# Patient Record
Sex: FEMALE | Race: WHITE | Employment: UNEMPLOYED | URBAN - METROPOLITAN AREA
[De-identification: names, ages, dates, MRNs, and addresses within clinical notes are randomized per-mention and may not be internally consistent; named-entity substitution may affect disease eponyms.]

---

## 2017-05-09 ENCOUNTER — HOSPITAL ENCOUNTER (EMERGENCY)
Age: 22
Discharge: HOME | End: 2017-05-09
Attending: EMERGENCY MEDICINE
Payer: COMMERCIAL

## 2017-05-09 VITALS
OXYGEN SATURATION: 100 % | HEIGHT: 65 IN | DIASTOLIC BLOOD PRESSURE: 70 MMHG | SYSTOLIC BLOOD PRESSURE: 117 MMHG | BODY MASS INDEX: 31.49 KG/M2 | WEIGHT: 189 LBS | HEART RATE: 65 BPM | RESPIRATION RATE: 17 BRPM | TEMPERATURE: 97.9 F

## 2017-05-09 DIAGNOSIS — F10.920 ACUTE ALCOHOLIC INTOXICATION, UNCOMPLICATED (HCC): ICD-10-CM

## 2017-05-09 DIAGNOSIS — T78.40XA ALLERGIC REACTION, INITIAL ENCOUNTER: Primary | ICD-10-CM

## 2017-05-09 LAB
ETHANOL PERCENT: 0.22 %
ETHANOL: 221 MG/DL

## 2017-05-09 PROCEDURE — 96374 THER/PROPH/DIAG INJ IV PUSH: CPT

## 2017-05-09 PROCEDURE — 2500000003 HC RX 250 WO HCPCS: Performed by: EMERGENCY MEDICINE

## 2017-05-09 PROCEDURE — 96375 TX/PRO/DX INJ NEW DRUG ADDON: CPT

## 2017-05-09 PROCEDURE — G0480 DRUG TEST DEF 1-7 CLASSES: HCPCS

## 2017-05-09 PROCEDURE — 6360000002 HC RX W HCPCS: Performed by: EMERGENCY MEDICINE

## 2017-05-09 PROCEDURE — S0028 INJECTION, FAMOTIDINE, 20 MG: HCPCS | Performed by: EMERGENCY MEDICINE

## 2017-05-09 PROCEDURE — 2580000003 HC RX 258: Performed by: EMERGENCY MEDICINE

## 2017-05-09 PROCEDURE — 99285 EMERGENCY DEPT VISIT HI MDM: CPT

## 2017-05-09 RX ORDER — PREDNISONE 10 MG/1
TABLET ORAL
Qty: 20 TABLET | Refills: 0 | Status: SHIPPED | OUTPATIENT
Start: 2017-05-09 | End: 2017-05-19

## 2017-05-09 RX ORDER — FAMOTIDINE 20 MG/1
20 TABLET, FILM COATED ORAL 2 TIMES DAILY
Qty: 10 TABLET | Refills: 0 | Status: SHIPPED | OUTPATIENT
Start: 2017-05-09 | End: 2018-12-17

## 2017-05-09 RX ORDER — DIPHENHYDRAMINE HCL 25 MG
25 CAPSULE ORAL EVERY 6 HOURS PRN
Qty: 20 CAPSULE | Refills: 0 | Status: SHIPPED | OUTPATIENT
Start: 2017-05-09 | End: 2017-05-19

## 2017-05-09 RX ORDER — METHYLPREDNISOLONE SODIUM SUCCINATE 125 MG/2ML
125 INJECTION, POWDER, LYOPHILIZED, FOR SOLUTION INTRAMUSCULAR; INTRAVENOUS ONCE
Status: COMPLETED | OUTPATIENT
Start: 2017-05-09 | End: 2017-05-09

## 2017-05-09 RX ORDER — 0.9 % SODIUM CHLORIDE 0.9 %
1000 INTRAVENOUS SOLUTION INTRAVENOUS ONCE
Status: COMPLETED | OUTPATIENT
Start: 2017-05-09 | End: 2017-05-09

## 2017-05-09 RX ADMIN — METHYLPREDNISOLONE SODIUM SUCCINATE 125 MG: 125 INJECTION, POWDER, FOR SOLUTION INTRAMUSCULAR; INTRAVENOUS at 17:29

## 2017-05-09 RX ADMIN — FAMOTIDINE 20 MG: 10 INJECTION, SOLUTION INTRAVENOUS at 17:29

## 2017-05-09 RX ADMIN — SODIUM CHLORIDE 1000 ML: 9 INJECTION, SOLUTION INTRAVENOUS at 17:29

## 2017-05-10 ASSESSMENT — ENCOUNTER SYMPTOMS
SHORTNESS OF BREATH: 0
WHEEZING: 0
FACIAL SWELLING: 1
STRIDOR: 0
ABDOMINAL PAIN: 0
VOMITING: 0
SORE THROAT: 0
NAUSEA: 0

## 2018-04-06 ENCOUNTER — OFFICE VISIT (OUTPATIENT)
Dept: FAMILY MEDICINE CLINIC | Age: 23
End: 2018-04-06
Payer: COMMERCIAL

## 2018-04-06 VITALS
BODY MASS INDEX: 27.97 KG/M2 | RESPIRATION RATE: 18 BRPM | HEIGHT: 66 IN | TEMPERATURE: 97.2 F | DIASTOLIC BLOOD PRESSURE: 64 MMHG | OXYGEN SATURATION: 98 % | HEART RATE: 58 BPM | WEIGHT: 174 LBS | SYSTOLIC BLOOD PRESSURE: 108 MMHG

## 2018-04-06 DIAGNOSIS — H00.011 HORDEOLUM EXTERNUM OF RIGHT UPPER EYELID: Primary | ICD-10-CM

## 2018-04-06 PROCEDURE — 99214 OFFICE O/P EST MOD 30 MIN: CPT | Performed by: NURSE PRACTITIONER

## 2018-04-06 RX ORDER — POLYMYXIN B SULFATE AND TRIMETHOPRIM 1; 10000 MG/ML; [USP'U]/ML
1 SOLUTION OPHTHALMIC EVERY 6 HOURS
Qty: 10 ML | Refills: 0 | Status: SHIPPED | OUTPATIENT
Start: 2018-04-06 | End: 2018-04-16

## 2018-04-06 RX ORDER — IBUPROFEN 800 MG/1
800 TABLET ORAL EVERY 6 HOURS PRN
COMMUNITY
End: 2018-12-17

## 2018-04-06 ASSESSMENT — ENCOUNTER SYMPTOMS
EYE REDNESS: 0
WHEEZING: 0
SORE THROAT: 0
FOREIGN BODY SENSATION: 0
VOICE CHANGE: 0
BLURRED VISION: 1
CHEST TIGHTNESS: 0
PHOTOPHOBIA: 0
EYE PAIN: 1
EYE DISCHARGE: 1
EYE ITCHING: 0
SINUS PRESSURE: 0
SHORTNESS OF BREATH: 0
COUGH: 0

## 2018-12-17 ENCOUNTER — OFFICE VISIT (OUTPATIENT)
Dept: FAMILY MEDICINE CLINIC | Age: 23
End: 2018-12-17
Payer: COMMERCIAL

## 2018-12-17 VITALS
WEIGHT: 213 LBS | BODY MASS INDEX: 34.23 KG/M2 | OXYGEN SATURATION: 99 % | SYSTOLIC BLOOD PRESSURE: 108 MMHG | HEART RATE: 101 BPM | HEIGHT: 66 IN | TEMPERATURE: 98 F | DIASTOLIC BLOOD PRESSURE: 62 MMHG | RESPIRATION RATE: 18 BRPM

## 2018-12-17 DIAGNOSIS — L73.9 FOLLICULITIS: Primary | ICD-10-CM

## 2018-12-17 PROCEDURE — 99213 OFFICE O/P EST LOW 20 MIN: CPT | Performed by: NURSE PRACTITIONER

## 2018-12-17 RX ORDER — CEPHALEXIN 500 MG/1
500 CAPSULE ORAL 3 TIMES DAILY
Qty: 30 CAPSULE | Refills: 0 | Status: SHIPPED | OUTPATIENT
Start: 2018-12-17 | End: 2018-12-27

## 2018-12-17 RX ORDER — MAGNESIUM HYDROXIDE 400 MG/5ML
1 SUSPENSION, ORAL (FINAL DOSE FORM) ORAL
COMMUNITY
End: 2021-12-06

## 2018-12-17 ASSESSMENT — PATIENT HEALTH QUESTIONNAIRE - PHQ9
2. FEELING DOWN, DEPRESSED OR HOPELESS: 0
SUM OF ALL RESPONSES TO PHQ QUESTIONS 1-9: 0
SUM OF ALL RESPONSES TO PHQ9 QUESTIONS 1 & 2: 0
1. LITTLE INTEREST OR PLEASURE IN DOING THINGS: 0
SUM OF ALL RESPONSES TO PHQ QUESTIONS 1-9: 0

## 2018-12-17 ASSESSMENT — ENCOUNTER SYMPTOMS
NAUSEA: 0
ABDOMINAL PAIN: 0

## 2019-03-14 ENCOUNTER — HOSPITAL ENCOUNTER (EMERGENCY)
Age: 24
Discharge: LEFT BEFORE DISCHARGE | End: 2019-03-14
Payer: COMMERCIAL

## 2020-01-13 ENCOUNTER — HOSPITAL ENCOUNTER (EMERGENCY)
Age: 25
Discharge: HOME | End: 2020-01-13
Attending: EMERGENCY MEDICINE
Payer: COMMERCIAL

## 2020-01-13 VITALS
RESPIRATION RATE: 16 BRPM | SYSTOLIC BLOOD PRESSURE: 120 MMHG | HEART RATE: 96 BPM | WEIGHT: 205 LBS | DIASTOLIC BLOOD PRESSURE: 75 MMHG | BODY MASS INDEX: 32.95 KG/M2 | TEMPERATURE: 98.1 F | HEIGHT: 66 IN | OXYGEN SATURATION: 96 %

## 2020-01-13 PROCEDURE — 99284 EMERGENCY DEPT VISIT MOD MDM: CPT

## 2020-01-13 PROCEDURE — 93005 ELECTROCARDIOGRAM TRACING: CPT | Performed by: EMERGENCY MEDICINE

## 2020-01-13 ASSESSMENT — ENCOUNTER SYMPTOMS
ABDOMINAL PAIN: 0
INGESTION: 1
SHORTNESS OF BREATH: 0

## 2020-01-13 NOTE — ED NOTES
Here per EMS. Assisted to stretcher. EMS states pt snorted fentanyl. Narcan 2mg given to pt per EMS intranasally. Dr Combs Hirsantiago in to see pt. Discussing episode with pt and December's overdose episode. Pt denies suicidal ideation. Dr Lauryn Levin removes nasal airway per pt request. On monitor. Heart tones strong, regular. Denies chest pain, SOB, or dyspnea. Skin pink, warm, dry. Cooperative. Tearful. Emotional support given.      Selwyn Galeazzi, RN  01/13/20 100 Doctor [address] Dr, RN  01/13/20 5581

## 2020-01-13 NOTE — ED NOTES
IV per EMS removed. [employer] called for ride home. Pt requested to have taxi drop off at the end of her street.      Kamila Lord, USHA  01/13/20 0149

## 2020-01-13 NOTE — ED NOTES
Bed: 26  Expected date: 1/13/20  Expected time: 3:09 PM  Means of arrival: [facility]  Comments:  [facility]     [facility]  01/13/20 1513

## 2020-01-13 NOTE — ED PROVIDER NOTES
EMERGENCY DEPARTMENT ENCOUNTER    Pt Name: Lyric Carter  MRN: [id number]  [place] 1995  Date of evaluation: 1/13/20  CHIEF COMPLAINT       Chief Complaint   Patient presents with    Drug Overdose     HISTORY OF PRESENT ILLNESS   Presents for heroin overdose. Found unresponsive in her car in a [employer] parking lot. No trauma. First responders gave Narcan 2 mg IN with positive response. Patient has no c/o aside from discomfort from nasal airway. Similar presentation to [facility] in December. Has a Narcan IN Tool Kit at home. States she is not pregnant, and currently on her menstrual period. The history is provided by the patient, the EMS personnel and medical records. Ingestion   Ingested substance:  Illicit drugs  Suspected agents: heroin. Time since incident:  30 minutes  Incident location: [facility] parking [facility]. Context: recreational    Associated symptoms: unresponsiveness    Associated symptoms: no abdominal pain, no chest pain, no diaphoresis, no headaches and no shortness of breath    Risk factors: similar prior episodes        REVIEW OF SYSTEMS     Review of Systems   Constitutional: Negative for diaphoresis. Respiratory: Negative for shortness of breath. Cardiovascular: Negative for chest pain and palpitations. Gastrointestinal: Negative for abdominal pain. Endocrine: Negative for cold intolerance and heat intolerance. Musculoskeletal: Negative for neck pain. Skin: Negative for wound. Neurological: Negative for headaches. Hematological: Does not bruise/bleed easily. Psychiatric/Behavioral: Negative for suicidal ideas.      PASTMEDICAL HISTORY     Past Medical History:   Diagnosis Date    Hepatitis C      SURGICAL HISTORY       Past Surgical History:   Procedure Laterality Date    WISDOM TOOTH EXTRACTION       CURRENT MEDICATIONS       Previous Medications    ASPIRIN 81 MG TABLET    Take 162 mg by mouth    PRENATAL MV-MIN-FE FUM-FA-DHA (PRENATAL MULTI +DHA) 27-0.8-250 MG MEDICATIONS:  New Prescriptions    No medications on file     Genny Bautista MD  Attending Emergency Physician                    Francoise Fiore MD  01/13/20 0754

## 2020-01-13 NOTE — ED NOTES
Unable to obtain ride home. Charge RN agreed to pay for taxi ride home.       Jose F Nielson RN  01/13/20 3795

## 2020-01-14 LAB
EKG ATRIAL RATE: 108 BPM
EKG P AXIS: 71 DEGREES
EKG P-R INTERVAL: 130 MS
EKG Q-T INTERVAL: 336 MS
EKG QRS DURATION: 84 MS
EKG QTC CALCULATION (BAZETT): 450 MS
EKG R AXIS: 75 DEGREES
EKG T AXIS: 30 DEGREES
EKG VENTRICULAR RATE: 108 BPM

## 2020-01-14 PROCEDURE — 93010 ELECTROCARDIOGRAM REPORT: CPT | Performed by: INTERNAL MEDICINE

## 2021-06-01 ENCOUNTER — OFFICE VISIT (OUTPATIENT)
Dept: PRIMARY CARE CLINIC | Age: 26
End: 2021-06-01
Payer: MEDICAID

## 2021-06-01 VITALS
TEMPERATURE: 97 F | DIASTOLIC BLOOD PRESSURE: 92 MMHG | WEIGHT: 195 LBS | HEART RATE: 100 BPM | HEIGHT: 67 IN | SYSTOLIC BLOOD PRESSURE: 130 MMHG | OXYGEN SATURATION: 98 % | BODY MASS INDEX: 30.61 KG/M2

## 2021-06-01 DIAGNOSIS — L02.512 ABSCESS OF DORSUM OF LEFT HAND: Primary | ICD-10-CM

## 2021-06-01 PROCEDURE — 99214 OFFICE O/P EST MOD 30 MIN: CPT | Performed by: FAMILY MEDICINE

## 2021-06-01 RX ORDER — SULFAMETHOXAZOLE AND TRIMETHOPRIM 800; 160 MG/1; MG/1
1 TABLET ORAL 2 TIMES DAILY
Qty: 20 TABLET | Refills: 0 | Status: SHIPPED | OUTPATIENT
Start: 2021-06-01 | End: 2021-06-11

## 2021-06-01 ASSESSMENT — ENCOUNTER SYMPTOMS
DIARRHEA: 0
SWOLLEN GLANDS: 0
COUGH: 0
CHANGE IN BOWEL HABIT: 0
WHEEZING: 0
SORE THROAT: 0
NAUSEA: 0
VOMITING: 0
SHORTNESS OF BREATH: 0

## 2021-06-01 ASSESSMENT — PATIENT HEALTH QUESTIONNAIRE - PHQ9
1. LITTLE INTEREST OR PLEASURE IN DOING THINGS: 0
SUM OF ALL RESPONSES TO PHQ9 QUESTIONS 1 & 2: 0
SUM OF ALL RESPONSES TO PHQ QUESTIONS 1-9: 0
2. FEELING DOWN, DEPRESSED OR HOPELESS: 0
SUM OF ALL RESPONSES TO PHQ QUESTIONS 1-9: 0
SUM OF ALL RESPONSES TO PHQ QUESTIONS 1-9: 0

## 2021-06-01 NOTE — PROGRESS NOTES
[facility] [address] [facility] [place] [facility]  [address] [place] [address] [address] [address]  Dept: [phone]  Dept Fax: [phone]    Mima Curling is a 27 y.o. female who presents today for her medical conditions/complaintsas noted below. Mima Curling is c/o of Other (abcess on left hand swollen red and warm to touch X 3 days )        HPI:     Patient is here for abscess of the left hand which is swollen erythematous and warm to touch  Patient reports that abscess started draining overnight    Other  This is a new problem. The current episode started in the past 7 days (3 days). The problem occurs constantly. The problem has been gradually improving. Associated symptoms include numbness and a rash. Pertinent negatives include no change in bowel habit, chills, coughing, fever, myalgias, nausea, sore throat, swollen glands or vomiting. Nothing aggravates the symptoms. She has tried NSAIDs and heat for the symptoms. The treatment provided moderate relief.    Patient reports that infection started likely due to IV drug use    Past Medical History:   Diagnosis Date    Hepatitis C     Past medical history reviewed and pertinent positives/negatives in the HPI    Past Surgical History:   Procedure Laterality Date    WISDOM TOOTH EXTRACTION         Family History   Problem Relation Age of Onset    No Known Problems Mother     No Known Problems Father        Social History     Tobacco Use    Smoking status: Current Every Day Smoker     Packs/day: 1.00     Types: Cigarettes    Smokeless tobacco: Never Used   Substance Use Topics    Alcohol use: No      Current Outpatient Medications   Medication Sig Dispense Refill    sulfamethoxazole-trimethoprim (BACTRIM DS) 800-160 MG per tablet Take 1 tablet by mouth 2 times daily for 10 days 20 tablet 0    aspirin 81 MG tablet Take 162 mg by mouth      Prenatal MV-Min-Fe Fum-FA-DHA (PRENATAL MULTI +DHA) 27-0.8-250 MG CAPS Take 1 tablet by mouth       No current facility-administered medications for this visit. Allergies   Allergen Reactions    Glucosamine Swelling    Shellfish-Derived Products Swelling    Other        Health Maintenance   Topic Date Due    Hepatitis C screen  Never done    Varicella vaccine (1 of 2 - 2-dose childhood series) Never done    Pneumococcal 0-64 years Vaccine (1 of 2 - PPSV23) Never done    COVID-19 Vaccine (1) Never done    HIV screen  Never done    Cervical cancer screen  Never done    Flu vaccine (Season Ended) 09/01/2021    DTaP/Tdap/Td vaccine (2 - Td) 01/28/2029    Hepatitis A vaccine  Aged Out    Hepatitis B vaccine  Aged Out    Hib vaccine  Aged Out    Meningococcal (ACWY) vaccine  Aged Out       :      Review of Systems   Constitutional: Negative for chills and fever. HENT: Negative. Negative for sore throat. Respiratory: Negative for cough, shortness of breath and wheezing. Gastrointestinal: Negative for change in bowel habit, diarrhea, nausea and vomiting. Musculoskeletal: Negative for myalgias. Skin: Positive for rash and wound. Neurological: Positive for numbness. Hematological: Negative for adenopathy. Objective:     Physical Exam  Vitals and nursing note reviewed. Constitutional:       Appearance: Normal appearance. She is obese. HENT:      Head: Normocephalic and atraumatic. Right Ear: Hearing normal.      Left Ear: Hearing normal.      Mouth/Throat:      Lips: Pink. Eyes:      Extraocular Movements: Extraocular movements intact. Conjunctiva/sclera: Conjunctivae normal.   Cardiovascular:      Rate and Rhythm: Normal rate and regular rhythm. Heart sounds: Normal heart sounds. Pulmonary:      Effort: Pulmonary effort is normal.      Breath sounds: Normal breath sounds. Musculoskeletal:      Cervical back: No muscular tenderness. Skin:     General: Skin is warm and dry. Findings: Abscess present.           Neurological:      Mental Status: She is alert and oriented to person, place, and time. Mental status is at baseline. Psychiatric:         Mood and Affect: Mood normal.         Behavior: Behavior normal.         Thought Content: Thought content normal.         Judgment: Judgment normal.       BP (!) 130/92 (Site: Left Upper Arm, Position: Sitting, Cuff Size: Large Adult)   Pulse 100   Temp 97 °F (36.1 °C) (Tympanic)   Ht 5' 7\" (1.702 m)   Wt 195 lb (88.5 kg)   SpO2 98%   Breastfeeding No   BMI 30.54 kg/m²     Assessment:       Diagnosis Orders   1. Abscess of dorsum of left hand         Plan:      Take antibiotic as prescribed for abscess/cellulitis  Keep wound clean and dry and change dressing as needed. May apply antibiotic ointment 2-3 times daily  If symptoms worsen or do not improve please follow-up with PCP or return to clinic  If you experience fever or increased heart rate then go to ER  No orders of the defined types were placed in this encounter. Orders Placed This Encounter   Medications    sulfamethoxazole-trimethoprim (BACTRIM DS) 800-160 MG per tablet     Sig: Take 1 tablet by mouth 2 times daily for 10 days     Dispense:  20 tablet     Refill:  0      Patient given educational materials - see patient instructions. Discussed use, benefit, and side effects of prescribed medications. All patient questions answered. Pt voiced understanding. Patient agreed with treatment plan. Follow up as directed.      Electronicallysigned by Brijesh Cardoso MD on 6/1/2021 at 2:07 PM

## 2021-06-01 NOTE — PATIENT INSTRUCTIONS
Patient Education        Skin Abscess: Care Instructions  Your Care Instructions     A skin abscess is a bacterial infection that forms a pocket of pus. A boil is a kind of skin abscess. The doctor may have cut an opening in the abscess so that the pus can drain out. You may have gauze in the cut so that the abscess will stay open and keep draining. You may need antibiotics. You will need to follow up with your doctor to make sure the infection has gone away. The doctor has checked you carefully, but problems can develop later. If you notice any problems or new symptoms, get medical treatment right away. Follow-up care is a key part of your treatment and safety. Be sure to make and go to all appointments, and call your doctor if you are having problems. It's also a good idea to know your test results and keep a list of the medicines you take. How can you care for yourself at home? · Apply warm and dry compresses, a heating pad set on low, or a hot water bottle 3 or 4 times a day for pain. Keep a cloth between the heat source and your skin. · If your doctor prescribed antibiotics, take them as directed. Do not stop taking them just because you feel better. You need to take the full course of antibiotics. · Take pain medicines exactly as directed. ? If the doctor gave you a prescription medicine for pain, take it as prescribed. ? If you are not taking a prescription pain medicine, ask your doctor if you can take an over-the-counter medicine. · Keep your bandage clean and dry. Change the bandage whenever it gets wet or dirty, or at least one time a day. · If the abscess was packed with gauze:  ? Keep follow-up appointments to have the gauze changed or removed. If the doctor instructed you to remove the gauze, follow the instructions you were given for how to remove it. ? After the gauze is removed, soak the area in warm water for 15 to 20 minutes 2 times a day, until the wound closes.   When should you call for help? Call your doctor now or seek immediate medical care if:    · You have signs of worsening infection, such as:  ? Increased pain, swelling, warmth, or redness. ? Red streaks leading from the infected skin. ? Pus draining from the wound. ? A fever. Watch closely for changes in your health, and be sure to contact your doctor if:    · You do not get better as expected. Where can you learn more? Go to https://InDMusicpeAzendooeb.My True Fit. org and sign in to your Learn [employer] account. Enter M690 in the TwoChop box to learn more about \"Skin Abscess: Care Instructions. \"     If you do not have an account, please click on the \"Sign Up Now\" link. Current as of: July 2, 2020               Content Version: 12.8  © 5492-2416 [employer]. Care instructions adapted under license by [facility] ([facility]). If you have questions about a medical condition or this instruction, always ask your healthcare professional. [address] any warranty or liability for your use of this information. Take antibiotic as prescribed for abscess/cellulitis  Keep wound clean and dry and change dressing as needed.  May apply antibiotic ointment 2-3 times daily  If symptoms worsen or do not improve please follow-up with PCP or return to clinic  If you experience fever or increased heart rate then go to ER

## 2021-06-30 ENCOUNTER — OFFICE VISIT (OUTPATIENT)
Dept: PRIMARY CARE CLINIC | Age: 26
End: 2021-06-30
Payer: MEDICAID

## 2021-06-30 ENCOUNTER — TELEPHONE (OUTPATIENT)
Dept: FAMILY MEDICINE CLINIC | Age: 26
End: 2021-06-30

## 2021-06-30 VITALS
BODY MASS INDEX: 30.61 KG/M2 | HEART RATE: 113 BPM | TEMPERATURE: 98.6 F | OXYGEN SATURATION: 98 % | WEIGHT: 195 LBS | HEIGHT: 67 IN

## 2021-06-30 DIAGNOSIS — L02.519 ABSCESS, HAND: Primary | ICD-10-CM

## 2021-06-30 PROCEDURE — 99214 OFFICE O/P EST MOD 30 MIN: CPT | Performed by: FAMILY MEDICINE

## 2021-06-30 RX ORDER — SULFAMETHOXAZOLE AND TRIMETHOPRIM 800; 160 MG/1; MG/1
1 TABLET ORAL 2 TIMES DAILY
Qty: 20 TABLET | Refills: 0 | Status: SHIPPED | OUTPATIENT
Start: 2021-06-30 | End: 2021-07-10

## 2021-06-30 ASSESSMENT — ENCOUNTER SYMPTOMS: RESPIRATORY NEGATIVE: 1

## 2021-06-30 NOTE — PROGRESS NOTES
[facility] [address] [facility] [place] [facility]  [address] [facility]  [place] [address]  Dept: [phone]  Dept Fax: [phone]    Froylan Al is a 27 y.o. female who presents today for her medical conditions/complaintsas noted below. Froylan Al is c/o of Swelling (right hand swelling, redness, pain x 4 days )        HPI:     Hand Pain   The incident occurred 3 to 5 days ago. The incident occurred at home. The injury mechanism is unknown (Patient reports possible bug bite. She reports that she has not used any drugs since she was seen at the beginning of June. ). The pain is present in the right hand. The pain is at a severity of 10/10. The pain is severe. The pain has been constant since the incident. Associated symptoms include muscle weakness, numbness (Right hand due to swelling) and tingling. The symptoms are aggravated by movement and palpation. She has tried nothing for the symptoms. The treatment provided no relief.        Past Medical History:   Diagnosis Date    Hepatitis C     Past medical history reviewed and pertinent positives/negatives in the HPI    Past Surgical History:   Procedure Laterality Date    WISDOM TOOTH EXTRACTION         Family History   Problem Relation Age of Onset    No Known Problems Mother     No Known Problems Father        Social History     Tobacco Use    Smoking status: Current Every Day Smoker     Packs/day: 1.00     Types: Cigarettes    Smokeless tobacco: Never Used   Substance Use Topics    Alcohol use: No      Current Outpatient Medications   Medication Sig Dispense Refill    sulfamethoxazole-trimethoprim (BACTRIM DS) 800-160 MG per tablet Take 1 tablet by mouth 2 times daily for 10 days 20 tablet 0    aspirin 81 MG tablet Take 162 mg by mouth (Patient not taking: Reported on 6/30/2021)      Prenatal MV-Min-Fe Fum-FA-DHA (PRENATAL MULTI +DHA) 27-0.8-250 MG CAPS Take 1 tablet by mouth (Patient not taking: Reported on 6/30/2021)       No current facility-administered medications for this visit. Allergies   Allergen Reactions    Glucosamine Swelling    Shellfish-Derived Products Swelling    Other        Health Maintenance   Topic Date Due    Hepatitis C screen  Never done    Varicella vaccine (1 of 2 - 2-dose childhood series) Never done    Pneumococcal 0-64 years Vaccine (1 of 2 - PPSV23) Never done    COVID-19 Vaccine (1) Never done    HIV screen  Never done    Cervical cancer screen  Never done    Flu vaccine (Season Ended) 09/01/2021    DTaP/Tdap/Td vaccine (2 - Td or Tdap) 01/28/2029    Hepatitis A vaccine  Aged Out    Hepatitis B vaccine  Aged Out    Hib vaccine  Aged Out    Meningococcal (ACWY) vaccine  Aged Out       :      Review of Systems   Constitutional: Negative for chills and fever. HENT: Negative. Respiratory: Negative. Cardiovascular: Negative. Musculoskeletal: Negative for myalgias. Skin: Positive for rash and wound. Neurological: Positive for tingling, weakness (Right hand due to swelling) and numbness (Right hand due to swelling). Hematological: Negative for adenopathy. Objective:     Physical Exam  Vitals and nursing note reviewed. Constitutional:       Appearance: Normal appearance. She is obese. HENT:      Head: Normocephalic and atraumatic. Right Ear: Hearing normal.      Left Ear: Hearing normal.      Mouth/Throat:      Lips: Pink. Eyes:      Extraocular Movements: Extraocular movements intact. Conjunctiva/sclera: Conjunctivae normal.   Cardiovascular:      Rate and Rhythm: Normal rate. Pulmonary:      Effort: Pulmonary effort is normal.   Musculoskeletal:         General: Swelling (Severe swelling of the dorsum of the right hand) and tenderness (With any sort of movement or light touch of the dorsum of the right hand) present. Cervical back: No muscular tenderness. Skin:     General: Skin is warm and dry.       Findings: Lesion (Severe erythema and swelling of the dorsum of right hand extending to the proximal phalanges. There is an area of fluctuation with a small pustule between the second and third MCPs) present. Neurological:      Mental Status: She is alert and oriented to person, place, and time. Mental status is at baseline. Psychiatric:         Mood and Affect: Mood normal.         Behavior: Behavior normal.         Thought Content: Thought content normal.         Judgment: Judgment normal.       Pulse 113   Temp 98.6 °F (37 °C) (Infrared)   Ht 5' 7\" (1.702 m)   Wt 195 lb (88.5 kg)   SpO2 98%   BMI 30.54 kg/m²       Procedure: Attempted drainage of abscess of dorsum of right hand. Patient declined any preprocedure numbing/analgesia. Dorsum of hand was cleaned with iodine swab. An 11 blade scalpel was then used to make approximately 3 mm incision over top of the pustule on the right hand. There was a small amount of purulent material expressed. Attempt was made to manually express more purulent material but patient did not tolerate this procedure. We then attempted to soak the hand in Hibiclens and water solution. However patient did not tolerate this either. Patient voiced that she did not wish to proceed with any further attempt at draining the abscess of her hand. She also did not wish to have the incision bandaged for fear that taking off the bandage would cause more pain. I recommended that patient go to ER for further evaluation and drainage of the abscess. I discussed the risks of not getting the abscess treated emergently including permanent loss of sensation or movement of the hand or spread of the infection. Patient verbalized understanding and voiced that she would go to ER if symptoms got any worse. Assessment:       Diagnosis Orders   1. Abscess, hand         Plan:    Take antibiotic as prescribed for infection of the hand.   Apply warm compresses to the hand often  Keep the wound clean and dry  Recommend going to ER for further evaluation and drainage of abscess on hand to prevent permanent damage to the hand or spread of infection    No orders of the defined types were placed in this encounter. Orders Placed This Encounter   Medications    sulfamethoxazole-trimethoprim (BACTRIM DS) 800-160 MG per tablet     Sig: Take 1 tablet by mouth 2 times daily for 10 days     Dispense:  20 tablet     Refill:  0      Patient given educational materials - see patient instructions. Discussed use, benefit, and side effects of prescribed medications. All patient questions answered. Pt voiced understanding. Patient agreed with treatment plan. Follow up as directed.      Electronicallysigned by Janet Mcclain MD on 6/30/2021 at 6:15 PM

## 2021-06-30 NOTE — TELEPHONE ENCOUNTER
Advised patient, she states shes in a lot of pain and asked if she can come in now?  I advised yes walk in is open until 8

## 2021-06-30 NOTE — PATIENT INSTRUCTIONS
Patient Education        Skin Abscess: Care Instructions  Your Care Instructions     A skin abscess is a bacterial infection that forms a pocket of pus. A boil is a kind of skin abscess. The doctor may have cut an opening in the abscess so that the pus can drain out. You may have gauze in the cut so that the abscess will stay open and keep draining. You may need antibiotics. You will need to follow up with your doctor to make sure the infection has gone away. The doctor has checked you carefully, but problems can develop later. If you notice any problems or new symptoms, get medical treatment right away. Follow-up care is a key part of your treatment and safety. Be sure to make and go to all appointments, and call your doctor if you are having problems. It's also a good idea to know your test results and keep a list of the medicines you take. How can you care for yourself at home? · Apply warm and dry compresses, a heating pad set on low, or a hot water bottle 3 or 4 times a day for pain. Keep a cloth between the heat source and your skin. · If your doctor prescribed antibiotics, take them as directed. Do not stop taking them just because you feel better. You need to take the full course of antibiotics. · Take pain medicines exactly as directed. ? If the doctor gave you a prescription medicine for pain, take it as prescribed. ? If you are not taking a prescription pain medicine, ask your doctor if you can take an over-the-counter medicine. · Keep your bandage clean and dry. Change the bandage whenever it gets wet or dirty, or at least one time a day. · If the abscess was packed with gauze:  ? Keep follow-up appointments to have the gauze changed or removed. If the doctor instructed you to remove the gauze, follow the instructions you were given for how to remove it. ? After the gauze is removed, soak the area in warm water for 15 to 20 minutes 2 times a day, until the wound closes.   When should you call for help? Call your doctor now or seek immediate medical care if:    · You have signs of worsening infection, such as:  ? Increased pain, swelling, warmth, or redness. ? Red streaks leading from the infected skin. ? Pus draining from the wound. ? A fever. Watch closely for changes in your health, and be sure to contact your doctor if:    · You do not get better as expected. Where can you learn more? Go to https://Elite FormpeiSTAReb.Icount.com. org and sign in to your [employer] account. Enter S822 in the PostHelpers box to learn more about \"Skin Abscess: Care Instructions. \"     If you do not have an account, please click on the \"Sign Up Now\" link. Current as of: March 3, 2021               Content Version: 12.9  © 2006-2021 [employer]. Care instructions adapted under license by [facility] ([facility]). If you have questions about a medical condition or this instruction, always ask your healthcare professional. [place] [address] any warranty or liability for your use of this information. Take antibiotic as prescribed for infection of the hand.   Apply warm compresses to the hand often  Keep the wound clean and dry  Recommend going to ER for further evaluation and drainage of abscess on hand to prevent permanent damage to the hand or spread of infection

## 2021-06-30 NOTE — TELEPHONE ENCOUNTER
----- Message from Laila Gotti sent at 6/30/2021  3:30 PM EDT -----  Subject: Message to Provider    QUESTIONS  Information for Provider? Patient stated that she has an absess on her   right middle finger and she can not come into the office due to house   arrest so she would like to have an antibiotic called in to [address] in [place]. Please contact to advise if this can be done.  ---------------------------------------------------------------------------  --------------  CALL BACK INFO  What is the best way for the office to contact you? OK to leave message on   voicemail  Preferred Call Back Phone Number? [phone]  ---------------------------------------------------------------------------  --------------  SCRIPT ANSWERS  Relationship to Patient?  Self

## 2021-07-01 ENCOUNTER — TELEPHONE (OUTPATIENT)
Dept: PRIMARY CARE CLINIC | Age: 26
End: 2021-07-01

## 2021-07-01 NOTE — TELEPHONE ENCOUNTER
Patient called stating that the abscess that she was seen for yesterday had \"burst open\" and would like to know what to clean it with. I spoke with the provider we have here today (Nannette Encinas APRN-CNP) and she advised pt uses soap and water and does warm epsom salt soaks. I asked pt further about it \"bursting open\" patient said it did not actually open up, it is just draining from where it was opened yesterday. Patient also said there is a new spot that looks like a white head. Pt advised to allow it to drain and if there is no improvement or gets worse at any point pt advised to go to ER. Pt voiced understanding.

## 2021-12-06 ENCOUNTER — HOSPITAL ENCOUNTER (OUTPATIENT)
Age: 26
Setting detail: SPECIMEN
Discharge: HOME | End: 2021-12-06

## 2021-12-06 ENCOUNTER — OFFICE VISIT (OUTPATIENT)
Dept: PRIMARY CARE CLINIC | Age: 26
End: 2021-12-06
Payer: MEDICAID

## 2021-12-06 VITALS
TEMPERATURE: 97.6 F | HEART RATE: 89 BPM | DIASTOLIC BLOOD PRESSURE: 78 MMHG | OXYGEN SATURATION: 97 % | SYSTOLIC BLOOD PRESSURE: 114 MMHG

## 2021-12-06 DIAGNOSIS — H10.33 ACUTE BACTERIAL CONJUNCTIVITIS OF BOTH EYES: ICD-10-CM

## 2021-12-06 DIAGNOSIS — H10.33 ACUTE BACTERIAL CONJUNCTIVITIS OF BOTH EYES: Primary | ICD-10-CM

## 2021-12-06 DIAGNOSIS — J01.00 ACUTE NON-RECURRENT MAXILLARY SINUSITIS: ICD-10-CM

## 2021-12-06 PROCEDURE — 99213 OFFICE O/P EST LOW 20 MIN: CPT | Performed by: PHYSICIAN ASSISTANT

## 2021-12-06 RX ORDER — AMOXICILLIN 500 MG/1
500 CAPSULE ORAL 2 TIMES DAILY
Qty: 20 CAPSULE | Refills: 0 | Status: SHIPPED | OUTPATIENT
Start: 2021-12-08 | End: 2021-12-06

## 2021-12-06 RX ORDER — PREDNISONE 20 MG/1
20 TABLET ORAL 2 TIMES DAILY
Qty: 10 TABLET | Refills: 0 | Status: SHIPPED | OUTPATIENT
Start: 2021-12-06 | End: 2021-12-11

## 2021-12-06 RX ORDER — POLYMYXIN B SULFATE AND TRIMETHOPRIM 1; 10000 MG/ML; [USP'U]/ML
1 SOLUTION OPHTHALMIC EVERY 4 HOURS
Qty: 1 EACH | Refills: 0 | Status: SHIPPED | OUTPATIENT
Start: 2021-12-06 | End: 2021-12-16

## 2021-12-06 RX ORDER — AMOXICILLIN 500 MG/1
500 CAPSULE ORAL 2 TIMES DAILY
Qty: 20 CAPSULE | Refills: 0 | Status: SHIPPED | OUTPATIENT
Start: 2021-12-08 | End: 2021-12-18

## 2021-12-06 RX ORDER — LORATADINE 10 MG/1
10 TABLET ORAL DAILY
Qty: 30 TABLET | Refills: 0 | Status: SHIPPED | OUTPATIENT
Start: 2021-12-06 | End: 2022-07-31 | Stop reason: ALTCHOICE

## 2021-12-06 ASSESSMENT — ENCOUNTER SYMPTOMS
EYE REDNESS: 1
GASTROINTESTINAL NEGATIVE: 1
SHORTNESS OF BREATH: 0
CHEST TIGHTNESS: 0
COUGH: 0
RESPIRATORY NEGATIVE: 1
EYE ITCHING: 1
SINUS PAIN: 0
EYE PAIN: 1
RHINORRHEA: 1
SINUS PRESSURE: 1
EYE DISCHARGE: 1

## 2021-12-06 NOTE — PROGRESS NOTES
[address]  [address] [address]  Phone: [phone]  Fax: [phone] [facility] Name: Alice Mccurdy  MRN: [id number]  Nathanielgfkaley 1995  Date of evaluation: 12/6/2021  Provider: Rishi Flood Dr       Chief Complaint   Patient presents with    Otalgia     bilateral but lt is worse and can't hear out of it and is popping    Eye Drainage     woke up with them crusty, green drainage - started yesterday and rt eye is worse than lt           HISTORY OF PRESENT ILLNESS  (Location/Symptom, Timing/Onset, Context/Setting, Quality, Duration, Modifying Factors, Severity.)   Alcie Mccurdy is a 27 y.o. White (non-) [1] female who presents to the office for evaluation of      Otalgia   There is pain in both ears. This is a new problem. The current episode started in the past 7 days. Associated symptoms include rhinorrhea. Pertinent negatives include no coughing or ear discharge. Conjunctivitis   Associated symptoms include a fever, eye itching, congestion, ear pain, rhinorrhea, eye discharge, eye pain and eye redness. Pertinent negatives include no ear discharge and no cough. Both eyes are affected. Nursing Notes were reviewed. REVIEW OF SYSTEMS    (2-9 systems for level 4, 10 or more for level 5)     Review of Systems   Constitutional: Positive for chills, fatigue and fever. HENT: Positive for congestion, ear pain, postnasal drip, rhinorrhea and sinus pressure. Negative for ear discharge and sinus pain. Eyes: Positive for pain, discharge, redness and itching. Respiratory: Negative. Negative for cough, chest tightness and shortness of breath. Cardiovascular: Negative. Gastrointestinal: Negative. Genitourinary: Negative. Musculoskeletal: Negative. Except as noted above the remainder of the review of systems was reviewed andnegative. PAST MEDICAL HISTORY   History reviewed. Past Medical History:   Diagnosis Date    Hepatitis C          SURGICAL HISTORY     History reviewed. Past Surgical History:   Procedure Laterality Date    WISDOM TOOTH EXTRACTION           CURRENT MEDICATIONS       Current Outpatient Medications   Medication Sig Dispense Refill    trimethoprim-polymyxin b (POLYTRIM) 78215-5.1 UNIT/ML-% ophthalmic solution Place 1 drop into both eyes every 4 hours for 10 days 1 each 0    predniSONE (DELTASONE) 20 MG tablet Take 1 tablet by mouth 2 times daily for 5 days 10 tablet 0    loratadine (CLARITIN) 10 MG tablet Take 1 tablet by mouth daily 30 tablet 0    [START ON 12/8/2021] amoxicillin (AMOXIL) 500 MG capsule Take 1 capsule by mouth 2 times daily for 10 days 20 capsule 0     No current facility-administered medications for this visit. ALLERGIES     Glucosamine, Shellfish-derived products, and Other    FAMILY HISTORY           Problem Relation Age of Onset    No Known Problems Mother     No Known Problems Father      Family Status   Relation Name Status    Mother  Alive    Father  Alive          SOCIAL HISTORY      reports that she has been smoking cigarettes. She has been smoking about 1.00 pack per day. She has never used smokeless tobacco. She reports current drug use. Drug: Opiates . She reports that she does not drink alcohol. PHYSICAL EXAM    (up to 7 for level 4, 8 or more for level 5)     Vitals:    12/06/21 0844   BP: 114/78   Site: Right Upper Arm   Position: Sitting   Cuff Size: Medium Adult   Pulse: 89   Temp: 97.6 °F (36.4 °C)   TempSrc: Temporal   SpO2: 97%         Physical Exam  Vitals and nursing note reviewed. Constitutional:       General: She is not in acute distress. Appearance: Normal appearance. She is not ill-appearing. HENT:      Head: Normocephalic and atraumatic. Right Ear: External ear normal.      Left Ear: External ear normal.      Nose: Congestion present.       Mouth/Throat:      Mouth: Mucous membranes are moist.      Pharynx: Posterior oropharyngeal erythema present. Eyes:      Extraocular Movements: Extraocular movements intact. Conjunctiva/sclera: Conjunctivae normal.      Pupils: Pupils are equal, round, and reactive to light. Cardiovascular:      Rate and Rhythm: Normal rate and regular rhythm. Pulmonary:      Effort: Pulmonary effort is normal.   Abdominal:      Palpations: Abdomen is soft. Skin:     General: Skin is warm and dry. Neurological:      Mental Status: She is alert and oriented to person, place, and time. DIFFERENTIAL DIAGNOSIS:       Paramjit Eduardo reviewed the disposition diagnosis with the patient and or their family/guardian. I have answered their questions and given discharge instructions. They voiced understanding of these instructions and did not have anyfurther questions or complaints. PROCEDURES:  Orders Placed This Encounter   Procedures    COVID-19     Standing Status:   Future     Standing Expiration Date:   12/6/2022     Scheduling Instructions:      1) Due to current limited availability of the COVID-19 test, tests will be prioritized based on responses to questions above. Testing may be delayed due to volume. 2) Print and instruct patient to adhere to CDC home isolation program. (Link Above)              3) Set up or refer patient for a monitoring program.              4) Have patient sign up for and leverage [employer] (if not previously done). Order Specific Question:   Is this test for diagnosis or screening? Answer:   Diagnosis of ill patient     Order Specific Question:   Symptomatic for COVID-19 as defined by CDC? Answer:   Yes     Order Specific Question:   Date of Symptom Onset     Answer:   12/3/2021     Order Specific Question:   Hospitalized for COVID-19? Answer:   No     Order Specific Question:   Admitted to ICU for COVID-19? Answer:   No     Order Specific Question:   Employed in healthcare setting?      Answer: Unknown     Order Specific Question:   Resident in a congregate (group) care setting? Answer:   Unknown     Order Specific Question:   Pregnant? Answer:   Unknown     Order Specific Question:   Previously tested for COVID-19? Answer:   Unknown       No results found for this visit on 12/06/21. FINALIMPRESSION      Visit Diagnoses and Associated Orders     Acute bacterial conjunctivitis of both eyes    -  Primary    COVID-19 [[id number] Custom]   - Future Order         Acute non-recurrent maxillary sinusitis        COVID-19 [[id number] Custom]   - Future Order         ORDERS WITHOUT AN ASSOCIATED DIAGNOSIS    trimethoprim-polymyxin b (POLYTRIM) 76016-2.1 UNIT/ML-% ophthalmic solution [21318]      predniSONE (DELTASONE) 20 MG tablet [6496]      loratadine (CLARITIN) 10 MG tablet [70330]      amoxicillin (AMOXIL) 500 MG capsule [451]              PLAN     Return if symptoms worsen or fail to improve. DISCHARGEMEDICATIONS:  Orders Placed This Encounter   Medications    trimethoprim-polymyxin b (POLYTRIM) 84054-8.1 UNIT/ML-% ophthalmic solution     Sig: Place 1 drop into both eyes every 4 hours for 10 days     Dispense:  1 each     Refill:  0    predniSONE (DELTASONE) 20 MG tablet     Sig: Take 1 tablet by mouth 2 times daily for 5 days     Dispense:  10 tablet     Refill:  0    loratadine (CLARITIN) 10 MG tablet     Sig: Take 1 tablet by mouth daily     Dispense:  30 tablet     Refill:  0    amoxicillin (AMOXIL) 500 MG capsule     Sig: Take 1 capsule by mouth 2 times daily for 10 days     Dispense:  20 capsule     Refill:  0         Plan:  Specimen sent for a culture. Possible treatment alteration based on the results. Based on the clinical exam findings-- I will treat this as a bacterial conjunctivitis at this time with antibiotic eye gtts. Cool compresses to the eyes if desired. Good hand hygiene encouraged. Patient agreeable to treatment plan.   Educational materials provided on AVS.  Follow up if symptoms do not improve/worsen. Based on the physical exam findings-- I believe the symptoms are related to OME. Flonase daily recommended. Meclizine as needed for the dizziness/nausea. Educational materials provided on AVS.  Follow up if symptoms do not improve/worsen. I believe that this is likely a viral illness based on the physical exam findings. Tylenol/Motrin for fever/discomfort. Patient agreeable to treatment plan. Educational materials provided on AVS.  Follow up if symptoms do not improve/worsen. Patient instructed to return to the office if symptoms worsen, return, or have any other concerns. Patient understands and is agreeable.          Gretchen Purvis PA-C 12/6/2021 9:21 AM

## 2021-12-06 NOTE — LETTER
[facility]  [address] [address]  Phone: [phone]  Fax: [phone]    Ernestina Lopez PA-C        December 6, 2021     Patient: Serafin Phillips   YOB: 1995   Date of Visit: 12/6/2021       To Whom it May Concern:    Serafin Phillips was seen in my clinic on 12/6/2021. She ius to remain off work until her [place] comes back negative  If you have any questions or concerns, please don't hesitate to call.     Sincerely,         Ernestina Lopez PA-C

## 2021-12-07 LAB
SARS-COV-2: NORMAL
SARS-COV-2: NOT DETECTED
SOURCE: NORMAL

## 2022-07-31 ENCOUNTER — OFFICE VISIT (OUTPATIENT)
Dept: PRIMARY CARE CLINIC | Age: 27
End: 2022-07-31
Payer: MEDICAID

## 2022-07-31 VITALS
DIASTOLIC BLOOD PRESSURE: 81 MMHG | SYSTOLIC BLOOD PRESSURE: 118 MMHG | BODY MASS INDEX: 33.75 KG/M2 | WEIGHT: 210 LBS | HEART RATE: 67 BPM | HEIGHT: 66 IN | OXYGEN SATURATION: 97 %

## 2022-07-31 DIAGNOSIS — L74.0 HEAT RASH: ICD-10-CM

## 2022-07-31 DIAGNOSIS — L56.8 PHOTODERMATITIS OR PHOTOSENSITIVITY: Primary | ICD-10-CM

## 2022-07-31 PROCEDURE — 99213 OFFICE O/P EST LOW 20 MIN: CPT

## 2022-07-31 RX ORDER — HYDROXYZINE HYDROCHLORIDE 25 MG/1
25 TABLET, FILM COATED ORAL EVERY 8 HOURS PRN
Qty: 30 TABLET | Refills: 0 | Status: SHIPPED | OUTPATIENT
Start: 2022-07-31 | End: 2022-08-10

## 2022-07-31 RX ORDER — METHYLPREDNISOLONE 4 MG/1
TABLET ORAL
Qty: 1 KIT | Refills: 0 | Status: SHIPPED | OUTPATIENT
Start: 2022-07-31 | End: 2022-08-06

## 2022-07-31 ASSESSMENT — ENCOUNTER SYMPTOMS
ABDOMINAL PAIN: 0
VOMITING: 0
SORE THROAT: 0
DIARRHEA: 0
WHEEZING: 0
RHINORRHEA: 0
EYE PAIN: 0
CHEST TIGHTNESS: 0
SHORTNESS OF BREATH: 0

## 2022-07-31 ASSESSMENT — PATIENT HEALTH QUESTIONNAIRE - PHQ9
SUM OF ALL RESPONSES TO PHQ QUESTIONS 1-9: 0
1. LITTLE INTEREST OR PLEASURE IN DOING THINGS: 0
SUM OF ALL RESPONSES TO PHQ9 QUESTIONS 1 & 2: 0
SUM OF ALL RESPONSES TO PHQ QUESTIONS 1-9: 0
2. FEELING DOWN, DEPRESSED OR HOPELESS: 0

## 2022-07-31 NOTE — PATIENT INSTRUCTIONS
Finish the entire course of oral steroid taper. Use Atarax as needed for itching. Recommend cool compress application. Avoid exposure sunlight and wear breathable clothing. Follow-up if no improvement.

## 2022-07-31 NOTE — PROGRESS NOTES
[address] [place] [facility] [id number] [place] [address] WALK IN CARE  [address]  [place] [address]  Dept: [phone]    Alice Mccurdy is a 32 y.o. female Established patient, who presents to the walk-in clinic today with conditions/complaints as noted below:    Chief Complaint   Patient presents with    Rash     Pt has rash on her arms and legs pt has been having headaches nausea and lightheaded   X 1 week after sitting in the sun and heat         HPI:     Rash  This is a new problem. The current episode started 1 to 4 weeks ago (about 1 week ago). The problem is unchanged. The affected locations include the right arm, left arm, left upper leg and right upper leg. The rash is characterized by itchiness, redness and pain. It is unknown if there was an exposure to a precipitant. Pertinent negatives include no diarrhea, eye pain, fever, rhinorrhea, shortness of breath, sore throat or vomiting. Past treatments include antihistamine and anti-itch cream (Benadryl, Calamine lotion). The treatment provided no relief. There is no history of allergies or eczema. Patient states that she was recently incarcerated and didn't have much sun exposure. She noticed that being outside in direct sunlight and humidity worsens it. Past Medical History:   Diagnosis Date    Hepatitis C        Current Outpatient Medications   Medication Sig Dispense Refill    methylPREDNISolone (MEDROL DOSEPACK) 4 MG tablet Take by mouth. 1 kit 0    hydrOXYzine HCl (ATARAX) 25 MG tablet Take 1 tablet by mouth every 8 hours as needed for Itching 30 tablet 0     No current facility-administered medications for this visit.        Allergies   Allergen Reactions    Glucosamine Swelling    Shellfish-Derived Products Swelling    Other        :     Review of Systems   Constitutional:  Negative for chills and fever. HENT:  Negative for rhinorrhea and sore throat. Eyes:  Negative for pain. Respiratory:  Negative for chest tightness, shortness of breath and wheezing. Cardiovascular:  Negative for chest pain. Gastrointestinal:  Negative for abdominal pain, diarrhea and vomiting. Musculoskeletal:  Negative for arthralgias and joint swelling. Skin:  Positive for rash. Neurological:  Positive for dizziness, light-headedness and headaches.     :     /81 (Site: Left Upper Arm, Position: Sitting, Cuff Size: Large Adult)   Pulse 67   Ht 5' 6\" (1.676 m)   Wt 210 lb (95.3 kg)   SpO2 97%   BMI 33.89 kg/m²     Physical Exam  Vitals reviewed. Constitutional:       General: She is not in acute distress. Appearance: Normal appearance. She is obese. HENT:      Head: Normocephalic and atraumatic. Right Ear: External ear normal.      Left Ear: External ear normal.      Nose: Nose normal.      Mouth/Throat:      Mouth: Mucous membranes are moist.      Pharynx: Oropharynx is clear. Eyes:      Conjunctiva/sclera: Conjunctivae normal.   Cardiovascular:      Rate and Rhythm: Normal rate and regular rhythm. Heart sounds: Normal heart sounds. Pulmonary:      Effort: Pulmonary effort is normal.      Breath sounds: Normal breath sounds. Musculoskeletal:         General: Normal range of motion. Cervical back: Neck supple. Skin:     General: Skin is warm and dry. Findings: Erythema and rash present. Rash is macular and papular. Comments: Scattered erythematous maculopapular rash to bilateral upper arms and upper lower extremities with warmth, no signs of infection, discharge, or dryness   Neurological:      General: No focal deficit present. Mental Status: She is alert and oriented to person, place, and time.    Psychiatric:         Mood and Affect: Mood normal.         Behavior: Behavior normal.         :          1. Photodermatitis or photosensitivity  - methylPREDNISolone (MEDROL DOSEPACK) 4 MG tablet; Take by mouth., Disp-1 kit, R-0Normal  -     hydrOXYzine HCl (ATARAX) 25 MG tablet; Take 1 tablet by mouth every 8 hours as needed for Itching, Disp-30 tablet, R-0Normal  2. Heat rash     :      Return if symptoms worsen or fail to improve. Orders Placed This Encounter   Medications    methylPREDNISolone (MEDROL DOSEPACK) 4 MG tablet     Sig: Take by mouth. Dispense:  1 kit     Refill:  0    hydrOXYzine HCl (ATARAX) 25 MG tablet     Sig: Take 1 tablet by mouth every 8 hours as needed for Itching     Dispense:  30 tablet     Refill:  0     Instructed to complete the entire course of oral steroid taper. Advised to avoid direct sun exposure and restrictive clothing. Recommend cool compress application. May use hydroxyzine as needed for itching. Follow-up if no improvement. Patient and/or parent given educational materials - see patient instructions. Discussed use, benefit, and side effects of prescribed medications. All patient questions answered. Patient and/or parent voiced understanding.       Electronically signed by RYLEE Zapien 7/31/2022 at 1:02 PM

## 2025-06-11 ENCOUNTER — OFFICE VISIT (OUTPATIENT)
Dept: FAMILY MEDICINE CLINIC | Age: 30
End: 2025-06-11
Payer: COMMERCIAL

## 2025-06-11 VITALS
OXYGEN SATURATION: 99 % | WEIGHT: 234 LBS | HEART RATE: 96 BPM | DIASTOLIC BLOOD PRESSURE: 82 MMHG | HEIGHT: 66 IN | BODY MASS INDEX: 37.61 KG/M2 | SYSTOLIC BLOOD PRESSURE: 122 MMHG

## 2025-06-11 DIAGNOSIS — Z13.1 DIABETES MELLITUS SCREENING: ICD-10-CM

## 2025-06-11 DIAGNOSIS — Z00.00 ROUTINE HEALTH MAINTENANCE: ICD-10-CM

## 2025-06-11 DIAGNOSIS — Z13.220 SCREENING FOR CHOLESTEROL LEVEL: ICD-10-CM

## 2025-06-11 DIAGNOSIS — Z76.89 ENCOUNTER TO ESTABLISH CARE: Primary | ICD-10-CM

## 2025-06-11 DIAGNOSIS — Z13.31 DEPRESSION SCREENING: ICD-10-CM

## 2025-06-11 DIAGNOSIS — R87.620 ATYPICAL SQUAMOUS CELL CHANGES OF UNDETERMINED SIGNIFICANCE (ASCUS) ON VAGINAL CYTOLOGY: ICD-10-CM

## 2025-06-11 DIAGNOSIS — E66.09 CLASS 2 OBESITY DUE TO EXCESS CALORIES WITHOUT SERIOUS COMORBIDITY WITH BODY MASS INDEX (BMI) OF 37.0 TO 37.9 IN ADULT: ICD-10-CM

## 2025-06-11 DIAGNOSIS — E66.812 CLASS 2 OBESITY DUE TO EXCESS CALORIES WITHOUT SERIOUS COMORBIDITY WITH BODY MASS INDEX (BMI) OF 37.0 TO 37.9 IN ADULT: ICD-10-CM

## 2025-06-11 DIAGNOSIS — Z11.4 SCREENING FOR HIV (HUMAN IMMUNODEFICIENCY VIRUS): ICD-10-CM

## 2025-06-11 DIAGNOSIS — F19.11 HISTORY OF SUBSTANCE ABUSE (HCC): ICD-10-CM

## 2025-06-11 DIAGNOSIS — E55.9 VITAMIN D INSUFFICIENCY: ICD-10-CM

## 2025-06-11 DIAGNOSIS — Z11.59 NEED FOR HEPATITIS C SCREENING TEST: ICD-10-CM

## 2025-06-11 PROBLEM — B19.20 VIRAL HEPATITIS C: Status: ACTIVE | Noted: 2018-09-17

## 2025-06-11 PROBLEM — F11.90 HEROIN USE AFFECTING PREGNANCY: Status: ACTIVE | Noted: 2018-09-17

## 2025-06-11 PROBLEM — R87.610 ATYPICAL SQUAMOUS CELL CHANGES OF UNDETERMINED SIGNIFICANCE (ASCUS) ON CERVICAL CYTOLOGY WITH POSITIVE HIGH RISK HUMAN PAPILLOMA VIRUS (HPV): Status: ACTIVE | Noted: 2019-01-28

## 2025-06-11 PROBLEM — O99.320 HEROIN USE AFFECTING PREGNANCY: Status: ACTIVE | Noted: 2018-09-17

## 2025-06-11 PROBLEM — R87.810 ATYPICAL SQUAMOUS CELL CHANGES OF UNDETERMINED SIGNIFICANCE (ASCUS) ON CERVICAL CYTOLOGY WITH POSITIVE HIGH RISK HUMAN PAPILLOMA VIRUS (HPV): Status: ACTIVE | Noted: 2019-01-28

## 2025-06-11 PROBLEM — F11.20 SEVERE OPIOID USE DISORDER (HCC): Status: ACTIVE | Noted: 2019-02-05

## 2025-06-11 PROCEDURE — 99385 PREV VISIT NEW AGE 18-39: CPT | Performed by: FAMILY MEDICINE

## 2025-06-11 RX ORDER — SODIUM FLUORIDE 1.1 G/100G
CREAM ORAL
COMMUNITY
Start: 2025-06-09

## 2025-06-11 SDOH — ECONOMIC STABILITY: FOOD INSECURITY: WITHIN THE PAST 12 MONTHS, THE FOOD YOU BOUGHT JUST DIDN'T LAST AND YOU DIDN'T HAVE MONEY TO GET MORE.: NEVER TRUE

## 2025-06-11 SDOH — ECONOMIC STABILITY: FOOD INSECURITY: WITHIN THE PAST 12 MONTHS, YOU WORRIED THAT YOUR FOOD WOULD RUN OUT BEFORE YOU GOT MONEY TO BUY MORE.: NEVER TRUE

## 2025-06-11 ASSESSMENT — ENCOUNTER SYMPTOMS
ALLERGIC/IMMUNOLOGIC NEGATIVE: 1
RESPIRATORY NEGATIVE: 1
EYES NEGATIVE: 1
GASTROINTESTINAL NEGATIVE: 1

## 2025-06-11 ASSESSMENT — PATIENT HEALTH QUESTIONNAIRE - PHQ9
SUM OF ALL RESPONSES TO PHQ QUESTIONS 1-9: 0
2. FEELING DOWN, DEPRESSED OR HOPELESS: NOT AT ALL
1. LITTLE INTEREST OR PLEASURE IN DOING THINGS: NOT AT ALL

## 2025-06-11 NOTE — PROGRESS NOTES
normal.         Behavior: Behavior normal.       ASSESSMENT/PLAN:       1. Encounter to establish care  Comments:  New to Clinic and Provider.  2. Routine health maintenance  -     CBC with Auto Differential; Future  -     Comprehensive Metabolic Panel; Future  -     Hemoglobin A1C; Future  -     Hepatitis C Antibody; Future  -     HIV Screen; Future  -     Lipid Panel; Future  -     TSH; Future  -     Urinalysis; Future  -     Vitamin D 25 Hydroxy; Future  3. Depression screening  Comments:  PHQ screening was negative.  Orders:  -     Comprehensive Metabolic Panel; Future  -     TSH; Future  4. Screening for cholesterol level  -     Lipid Panel; Future  5. Atypical squamous cell changes of undetermined significance (ASCUS) on vaginal cytology  -     Amb External Referral To Ob-Gyn  6. Screening for HIV (human immunodeficiency virus)  -     HIV Screen; Future  7. Need for hepatitis C screening test  -     Hepatitis C Antibody; Future  8. Diabetes mellitus screening  -     Comprehensive Metabolic Panel; Future  -     Hemoglobin A1C; Future  -     Urinalysis; Future  9. History of substance abuse (HCC)  Comments:  gets screeing thru   10. Vitamin D insufficiency  -     Vitamin D 25 Hydroxy; Future  11. Class 2 obesity due to excess calories without serious comorbidity with body mass index (BMI) of 37.0 to 37.9 in adult  12. BMI 37.0-37.9, adult         No follow-ups on file.  Labs/testing as ordered.  Will notify of results/recommendation when known.  AVS provided.      Electronically signed by Robert Cadet DO on 6/11/2025 at 8:37 AM

## 2025-06-24 ENCOUNTER — HOSPITAL ENCOUNTER (OUTPATIENT)
Age: 30
Setting detail: SPECIMEN
Discharge: HOME | End: 2025-06-24

## 2025-06-24 DIAGNOSIS — Z13.31 DEPRESSION SCREENING: ICD-10-CM

## 2025-06-24 DIAGNOSIS — Z00.00 ROUTINE HEALTH MAINTENANCE: ICD-10-CM

## 2025-06-24 DIAGNOSIS — Z11.4 SCREENING FOR HIV (HUMAN IMMUNODEFICIENCY VIRUS): ICD-10-CM

## 2025-06-24 DIAGNOSIS — E55.9 VITAMIN D INSUFFICIENCY: ICD-10-CM

## 2025-06-24 DIAGNOSIS — Z13.220 SCREENING FOR CHOLESTEROL LEVEL: ICD-10-CM

## 2025-06-24 DIAGNOSIS — Z13.1 DIABETES MELLITUS SCREENING: ICD-10-CM

## 2025-06-24 DIAGNOSIS — Z11.59 NEED FOR HEPATITIS C SCREENING TEST: ICD-10-CM

## 2025-06-24 LAB
25(OH)D3 SERPL-MCNC: 30 NG/ML (ref 30–100)
ALBUMIN SERPL-MCNC: 4.4 G/DL (ref 3.5–5.2)
ALBUMIN/GLOB SERPL: 1.7 {RATIO} (ref 1–2.5)
ALP SERPL-CCNC: 80 U/L (ref 35–104)
ALT SERPL-CCNC: 19 U/L (ref 10–35)
ANION GAP SERPL CALCULATED.3IONS-SCNC: 10 MMOL/L (ref 9–16)
AST SERPL-CCNC: 16 U/L (ref 10–35)
BASOPHILS # BLD: 0.05 K/UL (ref 0–0.2)
BASOPHILS NFR BLD: 1 % (ref 0–2)
BILIRUB SERPL-MCNC: 0.4 MG/DL (ref 0–1.2)
BUN SERPL-MCNC: 15 MG/DL (ref 6–20)
CALCIUM SERPL-MCNC: 9.6 MG/DL (ref 8.6–10.4)
CHLORIDE SERPL-SCNC: 108 MMOL/L (ref 98–107)
CHOLEST SERPL-MCNC: 191 MG/DL (ref 0–199)
CHOLESTEROL/HDL RATIO: 4.4
CO2 SERPL-SCNC: 22 MMOL/L (ref 20–31)
CREAT SERPL-MCNC: 0.8 MG/DL (ref 0.6–0.9)
EOSINOPHIL # BLD: 0.2 K/UL (ref 0–0.44)
EOSINOPHILS RELATIVE PERCENT: 3 % (ref 1–4)
ERYTHROCYTE [DISTWIDTH] IN BLOOD BY AUTOMATED COUNT: 13.8 % (ref 11.8–14.4)
EST. AVERAGE GLUCOSE BLD GHB EST-MCNC: 105 MG/DL
GFR, ESTIMATED: >90 ML/MIN/1.73M2
GLUCOSE SERPL-MCNC: 98 MG/DL (ref 74–99)
HBA1C MFR BLD: 5.3 % (ref 4–6)
HCT VFR BLD AUTO: 42.3 % (ref 36.3–47.1)
HCV AB SERPL QL IA: REACTIVE
HDLC SERPL-MCNC: 43 MG/DL
HGB BLD-MCNC: 13.6 G/DL (ref 11.9–15.1)
HIV 1+2 AB+HIV1 P24 AG SERPL QL IA: NONREACTIVE
IMM GRANULOCYTES # BLD AUTO: <0.03 K/UL (ref 0–0.3)
IMM GRANULOCYTES NFR BLD: 0 %
LDLC SERPL CALC-MCNC: 122 MG/DL (ref 0–100)
LYMPHOCYTES NFR BLD: 2.35 K/UL (ref 1.1–3.7)
LYMPHOCYTES RELATIVE PERCENT: 29 % (ref 24–43)
MCH RBC QN AUTO: 29.9 PG (ref 25.2–33.5)
MCHC RBC AUTO-ENTMCNC: 32.2 G/DL (ref 28.4–34.8)
MCV RBC AUTO: 93 FL (ref 82.6–102.9)
MONOCYTES NFR BLD: 0.74 K/UL (ref 0.1–1.2)
MONOCYTES NFR BLD: 9 % (ref 3–12)
NEUTROPHILS NFR BLD: 58 % (ref 36–65)
NEUTS SEG NFR BLD: 4.72 K/UL (ref 1.5–8.1)
NRBC BLD-RTO: 0 PER 100 WBC
PLATELET # BLD AUTO: 315 K/UL (ref 138–453)
PMV BLD AUTO: 10.1 FL (ref 8.1–13.5)
POTASSIUM SERPL-SCNC: 4.7 MMOL/L (ref 3.7–5.3)
PROT SERPL-MCNC: 7 G/DL (ref 6.6–8.7)
RBC # BLD AUTO: 4.55 M/UL (ref 3.95–5.11)
SODIUM SERPL-SCNC: 140 MMOL/L (ref 136–145)
TRIGL SERPL-MCNC: 132 MG/DL
TSH SERPL DL<=0.05 MIU/L-ACNC: 1.03 UIU/ML (ref 0.27–4.2)
VLDLC SERPL CALC-MCNC: 26 MG/DL (ref 1–30)
WBC OTHER # BLD: 8.1 K/UL (ref 3.5–11.3)

## 2025-06-25 ENCOUNTER — OFFICE VISIT (OUTPATIENT)
Dept: FAMILY MEDICINE CLINIC | Age: 30
End: 2025-06-25
Payer: COMMERCIAL

## 2025-06-25 ENCOUNTER — RESULTS FOLLOW-UP (OUTPATIENT)
Dept: FAMILY MEDICINE CLINIC | Age: 30
End: 2025-06-25

## 2025-06-25 VITALS
BODY MASS INDEX: 36.71 KG/M2 | OXYGEN SATURATION: 98 % | SYSTOLIC BLOOD PRESSURE: 118 MMHG | WEIGHT: 228.4 LBS | HEART RATE: 107 BPM | HEIGHT: 66 IN | DIASTOLIC BLOOD PRESSURE: 82 MMHG

## 2025-06-25 DIAGNOSIS — Z09 FOLLOW-UP EXAMINATION: Primary | ICD-10-CM

## 2025-06-25 DIAGNOSIS — L55.1 SUNBURN, BLISTERING: ICD-10-CM

## 2025-06-25 DIAGNOSIS — E66.812 CLASS 2 OBESITY DUE TO EXCESS CALORIES WITHOUT SERIOUS COMORBIDITY WITH BODY MASS INDEX (BMI) OF 37.0 TO 37.9 IN ADULT: ICD-10-CM

## 2025-06-25 DIAGNOSIS — E66.09 CLASS 2 OBESITY DUE TO EXCESS CALORIES WITHOUT SERIOUS COMORBIDITY WITH BODY MASS INDEX (BMI) OF 37.0 TO 37.9 IN ADULT: ICD-10-CM

## 2025-06-25 DIAGNOSIS — B18.2 CHRONIC HEPATITIS C WITHOUT HEPATIC COMA (HCC): ICD-10-CM

## 2025-06-25 PROCEDURE — 99213 OFFICE O/P EST LOW 20 MIN: CPT | Performed by: FAMILY MEDICINE

## 2025-06-25 RX ORDER — LORATADINE 10 MG/1
10 TABLET ORAL DAILY
Qty: 10 TABLET | Refills: 0 | Status: SHIPPED | OUTPATIENT
Start: 2025-06-25

## 2025-06-25 RX ORDER — TRIAMCINOLONE ACETONIDE 40 MG/ML
40 INJECTION, SUSPENSION INTRA-ARTICULAR; INTRAMUSCULAR ONCE
Status: COMPLETED | OUTPATIENT
Start: 2025-06-25 | End: 2025-06-25

## 2025-06-25 RX ORDER — METHYLPREDNISOLONE 4 MG/1
TABLET ORAL
Qty: 1 KIT | Refills: 0 | Status: SHIPPED | OUTPATIENT
Start: 2025-06-25 | End: 2025-07-01

## 2025-06-25 RX ADMIN — TRIAMCINOLONE ACETONIDE 40 MG: 40 INJECTION, SUSPENSION INTRA-ARTICULAR; INTRAMUSCULAR at 16:59

## 2025-06-25 ASSESSMENT — ENCOUNTER SYMPTOMS
RESPIRATORY NEGATIVE: 1
GASTROINTESTINAL NEGATIVE: 1
ALLERGIC/IMMUNOLOGIC NEGATIVE: 1
EYES NEGATIVE: 1

## 2025-06-25 NOTE — PROGRESS NOTES
After obtaining consent, and per orders of Dr. Cadet, injection of Kenalog given in Left deltoid per pt req by Janeth Castle MA. Patient instructed to remain in clinic for 20 minutes afterwards, and to report any adverse reaction to me immediately.

## 2025-06-25 NOTE — PROGRESS NOTES
[facility]     Date of Visit:  2025  Patient Name: Tatiana Harp   Patient :  1995     CHIEF COMPLAINT:     Tatiana Harp is a 30 y.o. female who presents today for an general visit to be evaluated for the following condition(s):    Chief Complaint   Patient presents with    Follow-up     Here in follow up to establishment visit with me on 2025 - please reference that notation.    Rash     Bad Sunburn       HISTORY OF PRESENT ILLNESS:         Follows with:  OB/GYN: Vonnie Woo MD - [facility]    Available medical records / info reviewed again today.  Chart updated this afternoon.  Recently out of long-term.  Single with 3 children.  Working presently at [employer].    Rash  This is a new problem. The current episode started in the past 7 days. The problem is unchanged. The rash is diffuse.        REVIEW OF SYSTEMS:        Review of Systems   Constitutional: Negative.    HENT: Negative.     Eyes: Negative.    Respiratory: Negative.     Cardiovascular: Negative.    Gastrointestinal: Negative.    Endocrine: Negative.    Genitourinary: Negative.    Musculoskeletal: Negative.    Skin:  Positive for rash.   Allergic/Immunologic: Negative.    Neurological: Negative.    Hematological: Negative.    Psychiatric/Behavioral: Negative.          REVIEWED INFORMATION      Current Outpatient Medications   Medication Sig Dispense Refill    methylPREDNISolone (MEDROL DOSEPACK) 4 MG tablet Take by mouth as directed on packaging - start 2025 1 kit 0    loratadine (CLARITIN) 10 MG tablet Take 1 tablet by mouth daily 10 tablet 0    DENTA 5000 PLUS 1.1 % CREA        Current Facility-Administered Medications   Medication Dose Route Frequency Provider Last Rate Last Admin    triamcinolone acetonide (KENALOG-40) injection 40 mg  40 mg IntraMUSCular Once             Allergies   Allergen Reactions    Glucosamine Swelling       Patient Active Problem List   Diagnosis    Atypical squamous cell